# Patient Record
Sex: FEMALE | Race: WHITE | HISPANIC OR LATINO | Employment: UNEMPLOYED | URBAN - METROPOLITAN AREA
[De-identification: names, ages, dates, MRNs, and addresses within clinical notes are randomized per-mention and may not be internally consistent; named-entity substitution may affect disease eponyms.]

---

## 2023-09-27 ENCOUNTER — TELEPHONE (OUTPATIENT)
Dept: OBSTETRICS AND GYNECOLOGY | Facility: CLINIC | Age: 18
End: 2023-09-27

## 2023-09-27 NOTE — TELEPHONE ENCOUNTER
----- Message from Won Fortune sent at 9/27/2023 12:34 PM CDT -----  Pt Requesting to Schedule an Appointment     Pt is requesting to schedule an appointment that our scheduling dept cannot schedule.    Who called: pt with   Best call back #: [phone]  When pt wants appt: 10/2/23  Reason for appt: birth control injection (second injection)  Additional notes: pt said she was getting this in [place] and is now living here so she would like to get it done here

## 2024-10-03 ENCOUNTER — TELEPHONE (OUTPATIENT)
Dept: OBSTETRICS AND GYNECOLOGY | Facility: CLINIC | Age: 19
End: 2024-10-03

## 2024-10-16 ENCOUNTER — TELEPHONE (OUTPATIENT)
Dept: OBSTETRICS AND GYNECOLOGY | Facility: CLINIC | Age: 19
End: 2024-10-16

## 2025-01-02 ENCOUNTER — CLINICAL SUPPORT (OUTPATIENT)
Dept: OBSTETRICS AND GYNECOLOGY | Facility: CLINIC | Age: 20
End: 2025-01-02

## 2025-01-02 DIAGNOSIS — N91.2 AMENORRHEA: Primary | ICD-10-CM

## 2025-01-02 NOTE — PROGRESS NOTES
Spoke with patient for a total of 60 minutes during phone call with assist of Lavell Bernal #[id number]. Pt appeared to connect to virtual visit but we could not connect with each other after couple of attempts. Pt requested to cont by phone. Updated chart to reflect up to date patient demographics.  Allergies, medications, pharmacy, medical/family history and OB history updated.  Patient was guided through expectations of care during pregnancy.  Pregnancy confirmation, anatomy scan & first routine OB appts scheduled.  Education provided & questions answered. Encouraged to send message or call office with any questions/concerns. Verbalized understanding.     Discussed with pt:    Lmp 8/5; currently 21w3d  taking PNV   denies n/v  denies cramping/spotting  + fetal movement  Precautions/warning signs discussed  Referred to [email or web address] for Preg A to Z guide & class schedule   Discussed benefits of breastfeeding - plans to breastfeed   New pt-requested 1st avail  Last appt in [place] on 11/27-will have records faxed over-number provided  Reports last u/s done in early preg  Requested assistance with applying for medicaid-will message [place] medicaid rep to reach out to pt